# Patient Record
Sex: MALE | Race: OTHER | URBAN - METROPOLITAN AREA
[De-identification: names, ages, dates, MRNs, and addresses within clinical notes are randomized per-mention and may not be internally consistent; named-entity substitution may affect disease eponyms.]

---

## 2021-11-16 ENCOUNTER — OFFICE VISIT (OUTPATIENT)
Dept: INTERNAL MEDICINE CLINIC | Facility: CLINIC | Age: 61
End: 2021-11-16
Payer: COMMERCIAL

## 2021-11-16 VITALS
DIASTOLIC BLOOD PRESSURE: 84 MMHG | HEART RATE: 58 BPM | SYSTOLIC BLOOD PRESSURE: 126 MMHG | WEIGHT: 184.69 LBS | BODY MASS INDEX: 27.35 KG/M2 | HEIGHT: 69 IN

## 2021-11-16 DIAGNOSIS — H91.93 BILATERAL HEARING LOSS, UNSPECIFIED HEARING LOSS TYPE: Primary | ICD-10-CM

## 2021-11-16 PROCEDURE — 3074F SYST BP LT 130 MM HG: CPT | Performed by: INTERNAL MEDICINE

## 2021-11-16 PROCEDURE — 99203 OFFICE O/P NEW LOW 30 MIN: CPT | Performed by: INTERNAL MEDICINE

## 2021-11-16 PROCEDURE — 3008F BODY MASS INDEX DOCD: CPT | Performed by: INTERNAL MEDICINE

## 2021-11-16 PROCEDURE — 3079F DIAST BP 80-89 MM HG: CPT | Performed by: INTERNAL MEDICINE

## 2021-11-16 NOTE — PROGRESS NOTES
Kell Bauman is a 57year old male. Patient presents with:  Establish Care  Hearing Problem  Headache  Memory Loss    HPI:   Mr. Rickie Warren presents this afternoon, accompanied by his wife, for his initial visit with me for evaluation.   His daughter, who a MD  11/16/2021  2:31 PM

## 2021-12-09 ENCOUNTER — TELEPHONE (OUTPATIENT)
Dept: OTOLARYNGOLOGY | Facility: CLINIC | Age: 61
End: 2021-12-09

## 2021-12-09 NOTE — TELEPHONE ENCOUNTER
Prior authorization for hearing test is needed. Pt is scheduled on 1-4-22 at 12 noon with Dr. Eleanor Love.